# Patient Record
Sex: FEMALE | Race: WHITE | Employment: OTHER | URBAN - METROPOLITAN AREA
[De-identification: names, ages, dates, MRNs, and addresses within clinical notes are randomized per-mention and may not be internally consistent; named-entity substitution may affect disease eponyms.]

---

## 2018-05-11 ENCOUNTER — HOSPITAL ENCOUNTER (OUTPATIENT)
Dept: NON INVASIVE DIAGNOSTICS | Age: 63
Discharge: HOME | End: 2018-05-11
Payer: MEDICARE

## 2018-05-11 LAB
LV EF: 55 %
LVEF MODALITY: NORMAL

## 2018-05-11 PROCEDURE — 93306 TTE W/DOPPLER COMPLETE: CPT

## 2018-08-08 ENCOUNTER — OFFICE VISIT (OUTPATIENT)
Dept: NEUROLOGY | Age: 63
End: 2018-08-08
Payer: MEDICARE

## 2018-08-08 VITALS
BODY MASS INDEX: 24.92 KG/M2 | HEIGHT: 64 IN | HEART RATE: 72 BPM | SYSTOLIC BLOOD PRESSURE: 109 MMHG | DIASTOLIC BLOOD PRESSURE: 68 MMHG | OXYGEN SATURATION: 97 % | TEMPERATURE: 98.1 F | WEIGHT: 146 LBS

## 2018-08-08 DIAGNOSIS — G35 MULTIPLE SCLEROSIS (HCC): Primary | ICD-10-CM

## 2018-08-08 DIAGNOSIS — M54.17 L-S RADICULOPATHY: ICD-10-CM

## 2018-08-08 PROCEDURE — 99214 OFFICE O/P EST MOD 30 MIN: CPT | Performed by: CLINICAL NURSE SPECIALIST

## 2018-08-08 RX ORDER — DULOXETIN HYDROCHLORIDE 60 MG/1
CAPSULE, DELAYED RELEASE ORAL
COMMUNITY
Start: 2018-05-30

## 2018-08-08 NOTE — PROGRESS NOTES
Kady Hamilton is a 57 y.o. right handed female     Diagnosed with RRMS many years ago   Tried on numerous DMTs    Previously on Avonex, Copaxone, Novantrone, Tysabri and recently tried Plegridy     Previously failed Avonex and Rebif   Was tried on Novantrone for 1 month   Started on Tysabri prior to it being removed by the FDA and then started on Copaxone    Felt as if she was [place] a heart attack\" with each Copaxone injection    Restarted Tysabri and then was enrolled in [facility]   She was MONICA Virus positive and for years we discussed alternative DMTs    She was hesitant to switch    Followed with Dr Lenore Peña and switched to [employer]   She was unable to tolerate and this medication was discontinued   He recommended she hold DMTs and I agreed     MRIs repeated in June 2017   Stable    She is alone for appt and an excellent historian    States they recently changed her thyroid medication and she is having trouble adjusting     No new medications reported  No new medical complaints  No chest pain or palpitations  No SOB  No vertigo, lightheadedness or loss of consciousness  No incontinence of bowels or bladder  No itching or bruising appreciated    ROS otherwise negative     Prior to Visit Medications    Medication Sig Taking? Authorizing Provider   DULoxetine (CYMBALTA) 60 MG extended release capsule  Yes Historical Provider, MD   traZODone (DESYREL) 100 MG tablet Take 100 mg by mouth Takes 1/2 tablet nightly Yes Historical Provider, MD   levothyroxine (SYNTHROID) 75 MCG tablet Take 75 mcg by mouth daily Yes Historical Provider, MD   eletriptan (RELPAX) 40 MG tablet Take 1 tablet by mouth once as needed (migraines) may repeat in 2 hours if necessary Yes Dorlee Kenzie, APRN - CNS   PROAIR  (90 BASE) MCG/ACT inhaler Inhale 1 puff into the lungs as needed  Yes Historical Provider, MD   Fish Oil OIL Take  by mouth 2 times daily.  1300 units daily 2x/day  Last dose 9/22/2014 Yes Historical Provider, MD

## 2019-03-05 ENCOUNTER — TELEPHONE (OUTPATIENT)
Dept: SURGERY | Age: 64
End: 2019-03-05

## 2019-03-19 ENCOUNTER — TELEPHONE (OUTPATIENT)
Dept: SURGERY | Age: 64
End: 2019-03-19

## 2019-04-08 ENCOUNTER — OFFICE VISIT (OUTPATIENT)
Dept: SURGERY | Age: 64
End: 2019-04-08
Payer: MEDICARE

## 2019-04-08 VITALS
WEIGHT: 137 LBS | TEMPERATURE: 98.3 F | OXYGEN SATURATION: 97 % | BODY MASS INDEX: 24.27 KG/M2 | HEART RATE: 74 BPM | RESPIRATION RATE: 16 BRPM | DIASTOLIC BLOOD PRESSURE: 86 MMHG | HEIGHT: 63 IN | SYSTOLIC BLOOD PRESSURE: 136 MMHG

## 2019-04-08 DIAGNOSIS — K63.5 COLORECTAL POLYP DETECTED ON COLONOSCOPY: Primary | ICD-10-CM

## 2019-04-08 PROCEDURE — 99203 OFFICE O/P NEW LOW 30 MIN: CPT | Performed by: SURGERY

## 2019-04-08 RX ORDER — MONTELUKAST SODIUM 10 MG/1
10 TABLET ORAL NIGHTLY
COMMUNITY
End: 2021-01-08

## 2019-04-08 NOTE — PROGRESS NOTES
70 yr old female presents for evaluation for colonoscopy. States last colonoscopy was 8-10 years ago and found to have polyps. Denies abd pain, change in bowel habits, blood in stool, or unintentional weight loss. Reports maternal grandmother and paternal grandfather with colon cancer hx. Upon review of records, pt's last colonoscopy was 9/2014--hyperplastic polyp. Past Medical History:   Diagnosis Date    Anxiety     Asthma     Colon polyps     hx of    GERD (gastroesophageal reflux disease)     hx of    Hyperlipidemia     Multiple sclerosis (HCC)     MVP (mitral valve prolapse)     with regurg    Thyroid disease        Past Surgical History:   Procedure Laterality Date    CARPAL TUNNEL RELEASE Left 2012    THYROIDECTOMY  1990's       Current Outpatient Medications   Medication Sig Dispense Refill    montelukast (SINGULAIR) 10 MG tablet Take 10 mg by mouth nightly      DULoxetine (CYMBALTA) 60 MG extended release capsule       traZODone (DESYREL) 100 MG tablet Take 50 mg by mouth nightly Takes 1/2 tablet nightly       levothyroxine (SYNTHROID) 75 MCG tablet Take 75 mcg by mouth daily      eletriptan (RELPAX) 40 MG tablet Take 1 tablet by mouth once as needed (migraines) may repeat in 2 hours if necessary 6 tablet 5    PROAIR  (90 BASE) MCG/ACT inhaler Inhale 1 puff into the lungs as needed       Fish Oil OIL Take  by mouth 2 times daily. 1300 units daily 2x/day  Last dose 9/22/2014      Cholecalciferol (VITAMIN D3) 1000 UNITS CAPS Take 1 capsule by mouth daily. Last dose 9/23/2014      Cyanocobalamin (VITAMIN B-12) 2500 MCG SUBL Place 1 tablet under the tongue daily. Last dose 9/24/2014      MULTIPLE VITAMIN PO Take 1 tablet by mouth daily. Last dose 9/22/2014      Coenzyme Q10 (COQ10) 100 MG CAPS Take 1 capsule by mouth daily. Last dose 9/17/2014      pravastatin (PRAVACHOL) 20 MG tablet Take 20 mg by mouth daily.       LORazepam (ATIVAN) 0.5 MG tablet Take 0.5 mg by mouth daily as needed. Instructed to take morning of surgery with a sip of water if needs      sertraline (ZOLOFT) 100 MG tablet Take 50 mg by mouth nightly. To help sleep      Beclomethasone Dipropionate (QVAR IN) Inhale 2 puffs into the lungs as needed. Instructed to use am of surgery if needs and bring in      Levalbuterol HCl (XOPENEX IN) Inhale 2 puffs into the lungs as needed. Instructed to use am of surgery  If needs and bring in       No current facility-administered medications for this visit. Allergies   Allergen Reactions    Modafinil Itching, Swelling and Anxiety    Sulfa Antibiotics Hives and Rash       Family History   Problem Relation Age of Onset   Tarri Gm Alzheimer's Disease Father     Heart Disease Father     Asthma Sister     Celiac Disease Sister     Asthma Brother     Asthma Sister     Diabetes Sister     Cancer Maternal Grandmother         colon    Cancer Paternal Grandfather         colon       Social History     Socioeconomic History    Marital status:      Spouse name: Not on file    Number of children: Not on file    Years of education: Not on file    Highest education level: Not on file   Occupational History    Not on file   Social Needs    Financial resource strain: Not on file    Food insecurity:     Worry: Not on file     Inability: Not on file    Transportation needs:     Medical: Not on file     Non-medical: Not on file   Tobacco Use    Smoking status: Former Smoker     Last attempt to quit: 9/3/1997     Years since quittin.6    Smokeless tobacco: Never Used   Substance and Sexual Activity    Alcohol use:  Yes     Alcohol/week: 0.0 oz     Comment: social    Drug use: Yes     Types: Marijuana     Comment: states she has a marijuana card, uses very minimally, does not carry oil with her    Sexual activity: Not on file   Lifestyle    Physical activity:     Days per week: Not on file     Minutes per session: Not on file    Stress: Not on file

## 2019-04-08 NOTE — PATIENT INSTRUCTIONS
Call [phone] for any questions/concerns. Call for any abdominal pain, change in bowel habits, blood in stool, or unintentional weight loss. Repeat colonoscopy 9/2024.

## 2019-07-31 ENCOUNTER — TELEPHONE (OUTPATIENT)
Dept: NEUROLOGY | Age: 64
End: 2019-07-31

## 2019-07-31 ENCOUNTER — OFFICE VISIT (OUTPATIENT)
Dept: NEUROLOGY | Age: 64
End: 2019-07-31
Payer: MEDICARE

## 2019-07-31 VITALS
HEART RATE: 76 BPM | SYSTOLIC BLOOD PRESSURE: 108 MMHG | DIASTOLIC BLOOD PRESSURE: 61 MMHG | HEIGHT: 65 IN | RESPIRATION RATE: 18 BRPM | OXYGEN SATURATION: 96 % | WEIGHT: 138.6 LBS | BODY MASS INDEX: 23.09 KG/M2

## 2019-07-31 DIAGNOSIS — G35 MULTIPLE SCLEROSIS (HCC): Primary | ICD-10-CM

## 2019-07-31 DIAGNOSIS — M54.17 L-S RADICULOPATHY: ICD-10-CM

## 2019-07-31 PROCEDURE — 99214 OFFICE O/P EST MOD 30 MIN: CPT | Performed by: CLINICAL NURSE SPECIALIST

## 2019-07-31 NOTE — PROGRESS NOTES
Davin Archibald is a 70 y.o. right handed female     Diagnosed with RRMS many years ago   Tried on numerous DMTs    Previously on Avonex, Copaxone, Novantrone, Tysabri and recently tried Plegridy     Previously failed Avonex and Rebif   Was tried on Novantrone for 1 month   Started on Tysabri prior to it being removed by the FDA and then started on Copaxone    Felt as if she was [place] a heart attack\" with each Copaxone injection  Restarted Tysabri and then was enrolled in [facility]   She was MONICA Virus positive and for years we discussed alternative DMTs    She was hesitant to switch    Followed with Dr Alessandra Desir in 2017 and switched to [address]   She was unable to tolerate and this medication was discontinued Jan 2018   He recommended she hold DMTs and I agreed   Now off DMT since Jan 2018      MRIs repeated in June 2017   Stable    She is alone for appt and an excellent historian    No new medications reported  No new medical complaints  No chest pain or palpitations  No SOB  No vertigo, lightheadedness or loss of consciousness  No incontinence of bowels or bladder  No itching or bruising appreciated    ROS otherwise negative     Prior to Visit Medications    Medication Sig Taking? Authorizing Provider   montelukast (SINGULAIR) 10 MG tablet Take 10 mg by mouth nightly Yes Historical Provider, MD   DULoxetine (CYMBALTA) 60 MG extended release capsule  Yes Historical Provider, MD   traZODone (DESYREL) 100 MG tablet Take 50 mg by mouth nightly Takes 1/2 tablet nightly  Yes Historical Provider, MD   levothyroxine (SYNTHROID) 75 MCG tablet Take 75 mcg by mouth daily Yes Historical Provider, MD   eletriptan (RELPAX) 40 MG tablet Take 1 tablet by mouth once as needed (migraines) may repeat in 2 hours if necessary Yes GER Guajardo - CNS   PROAIR  (90 BASE) MCG/ACT inhaler Inhale 1 puff into the lungs as needed  Yes Historical Provider, MD   Fish Oil OIL Take  by mouth 2 times daily.  1300 units daily visual fields Full   II: pupils MAKENZIE   III,VII: ptosis None   III,IV,VI: extraocular muscles  Full ROM   V: mastication Normal   V: facial light touch sensation  Normal   V,VII: corneal reflex  Present   VII: facial muscle function - upper     VII: facial muscle function - lower Normal   VIII: hearing Normal   IX: soft palate elevation  Normal   IX,X: gag reflex Present   XI: trapezius strength  5/5   XI: sternocleidomastoid strength 5/5   XI: neck extension strength  5/5   XII: tongue strength  Normal     R red desaturation  R James    Motor:  5/5 throughout   Bulk and tone normal    Sensory:  LT and PP normal  Vibration minimally decreased in ankles -- symmetrical     Coordination:   FN, FFM, GINGER, HS normal    Gait:  Normal   I appreciate no foot drop     DTR:   Right Brachioradialis reflex 1+  Left Brachioradialis reflex 1+  Right Biceps reflex 1+  Left Biceps reflex 1+  Right Quadriceps reflex 2+  Left Quadriceps reflex 2+  Right Achilles reflex 0  Left Achilles reflex 0    No Healy's     Laboratory/Radiology:     MRI brain and c-spine reviewed form June 2017   Stable lesions reported   Cervical disc disease reviewed with patient as well    MONICA Virus + from [facility]     Assessment:     RRMS - stable without DMT   EDSS 1 -- optic neuritis     Leg burning and tingling possibly related to LS radiculopathy     Plan:      Will continue to hold DMT    MRIs -- it has been 2 years   Some increasing issues in the heat   Off DMT for 18 months     Call with issues    RTO 6 months    Boris Porter  8:41 AM  7/31/2019

## 2019-08-22 ENCOUNTER — TELEPHONE (OUTPATIENT)
Dept: NEUROLOGY | Age: 64
End: 2019-08-22

## 2019-08-24 ENCOUNTER — HOSPITAL ENCOUNTER (OUTPATIENT)
Dept: MRI IMAGING | Age: 64
Discharge: HOME | End: 2019-08-26
Payer: MEDICARE

## 2019-08-24 DIAGNOSIS — G35 MULTIPLE SCLEROSIS (HCC): ICD-10-CM

## 2019-08-24 PROCEDURE — 70551 MRI BRAIN STEM W/O DYE: CPT

## 2019-08-24 PROCEDURE — 72141 MRI NECK SPINE W/O DYE: CPT

## 2019-10-14 ENCOUNTER — OFFICE VISIT (OUTPATIENT)
Dept: NEUROLOGY | Age: 64
End: 2019-10-14
Payer: MEDICARE

## 2019-10-14 VITALS
HEART RATE: 76 BPM | SYSTOLIC BLOOD PRESSURE: 122 MMHG | BODY MASS INDEX: 23.73 KG/M2 | DIASTOLIC BLOOD PRESSURE: 83 MMHG | HEIGHT: 64 IN | RESPIRATION RATE: 17 BRPM | WEIGHT: 139 LBS | OXYGEN SATURATION: 95 %

## 2019-10-14 DIAGNOSIS — G35 MULTIPLE SCLEROSIS (HCC): Primary | ICD-10-CM

## 2019-10-14 DIAGNOSIS — R41.841 COGNITIVE COMMUNICATION DEFICIT: ICD-10-CM

## 2019-10-14 PROCEDURE — 99214 OFFICE O/P EST MOD 30 MIN: CPT | Performed by: CLINICAL NURSE SPECIALIST

## 2019-10-14 RX ORDER — PENICILLIN V POTASSIUM 500 MG/1
TABLET ORAL
Refills: 0 | COMMUNITY
Start: 2019-08-13 | End: 2019-10-14

## 2019-10-14 RX ORDER — PANTOPRAZOLE SODIUM 20 MG/1
TABLET, DELAYED RELEASE ORAL
Refills: 1 | COMMUNITY
Start: 2019-09-13 | End: 2021-01-08

## 2019-10-16 ENCOUNTER — HOSPITAL ENCOUNTER (OUTPATIENT)
Dept: SPEECH THERAPY | Age: 64
Setting detail: THERAPIES SERIES
Discharge: HOME | End: 2019-10-16
Payer: MEDICARE

## 2019-10-16 PROCEDURE — 96125 COGNITIVE TEST BY HC PRO: CPT

## 2020-07-22 ENCOUNTER — HOSPITAL ENCOUNTER (OUTPATIENT)
Age: 65
Discharge: HOME | End: 2020-07-22
Payer: MEDICARE

## 2020-07-22 ENCOUNTER — OFFICE VISIT (OUTPATIENT)
Dept: NEUROLOGY | Age: 65
End: 2020-07-22
Payer: MEDICARE

## 2020-07-22 VITALS
SYSTOLIC BLOOD PRESSURE: 130 MMHG | HEART RATE: 76 BPM | WEIGHT: 128 LBS | OXYGEN SATURATION: 95 % | RESPIRATION RATE: 20 BRPM | BODY MASS INDEX: 21.85 KG/M2 | TEMPERATURE: 98.1 F | DIASTOLIC BLOOD PRESSURE: 85 MMHG | HEIGHT: 64 IN

## 2020-07-22 LAB
FOLATE: 13.3 NG/ML (ref 4.8–24.2)
HOMOCYSTEINE: 9 UMOL/L (ref 0–15)
TSH SERPL DL<=0.05 MIU/L-ACNC: 0.58 UIU/ML (ref 0.27–4.2)
VITAMIN B-12: >2000 PG/ML (ref 211–946)

## 2020-07-22 PROCEDURE — 99214 OFFICE O/P EST MOD 30 MIN: CPT | Performed by: CLINICAL NURSE SPECIALIST

## 2020-07-22 PROCEDURE — 82607 VITAMIN B-12: CPT

## 2020-07-22 PROCEDURE — 86592 SYPHILIS TEST NON-TREP QUAL: CPT

## 2020-07-22 PROCEDURE — 83090 ASSAY OF HOMOCYSTEINE: CPT

## 2020-07-22 PROCEDURE — 84443 ASSAY THYROID STIM HORMONE: CPT

## 2020-07-22 PROCEDURE — 86038 ANTINUCLEAR ANTIBODIES: CPT

## 2020-07-22 PROCEDURE — 82746 ASSAY OF FOLIC ACID SERUM: CPT

## 2020-07-22 PROCEDURE — 86039 ANTINUCLEAR ANTIBODIES (ANA): CPT

## 2020-07-22 PROCEDURE — 36415 COLL VENOUS BLD VENIPUNCTURE: CPT

## 2020-07-22 NOTE — PROGRESS NOTES
Steven Funes is a 75 y.o. right handed female     Diagnosed with RRMS many years ago   Tried on numerous DMTs    Previously on Avonex, Copaxone, Novantrone, Tysabri and recently tried Plegridy   Previously failed Avonex and Rebif   Was tried on Novantrone for 1 month   Started on Tysabri prior to it being removed by the FDA and then started on Copaxone    Felt as if she was [place] a heart attack\" with each Copaxone injection    Restarted Tysabri and then was enrolled in [facility]   She was MONICA Virus positive and for years we discussed alternative DMTs    She was hesitant to switch  Followed with Dr Cherylene Sensor in 2017 and switched to [address]   She was unable to tolerate and this medication was discontinued Jan 2018   He recommended she hold DMTs and I agreed   Now off DMT since Jan 2018      MRIs repeated in 2017 and 2019    Stable    She was here with her daughter noting short term memory problems last appt   Formal cog eval demonstrated only mild disease and therapy not recommended    Exercises at home with no change    Family hx of Alzheimer's (dad)    Lab studies to be obtained   Agreeable to trying therapy if now warranted     No new medications reported  No new medical complaints  No chest pain or palpitations  No SOB  No vertigo, lightheadedness or loss of consciousness  No incontinence of bowels or bladder  No itching or bruising appreciated    ROS otherwise negative     Prior to Visit Medications    Medication Sig Taking?  Authorizing Provider   pantoprazole (PROTONIX) 20 MG tablet TAKE 2 TABLETS BY MOUTH IN THE MORNING Yes Historical Provider, MD   montelukast (SINGULAIR) 10 MG tablet Take 10 mg by mouth nightly Yes Historical Provider, MD   DULoxetine (CYMBALTA) 60 MG extended release capsule  Yes Historical Provider, MD   traZODone (DESYREL) 100 MG tablet Take 50 mg by mouth nightly Takes 1/2 tablet nightly  Yes Historical Provider, MD   levothyroxine (SYNTHROID) 75 MCG tablet Take 75 mcg by mouth daily Yes Historical Provider, MD   PROAIR  (90 BASE) MCG/ACT inhaler Inhale 1 puff into the lungs as needed  Yes Historical Provider, MD   Fish Oil OIL Take  by mouth 2 times daily. 1300 units daily 2x/day  Last dose 9/22/2014 Yes Historical Provider, MD   Cholecalciferol (VITAMIN D3) 1000 UNITS CAPS Take 1 capsule by mouth daily. Last dose 9/23/2014 Yes Historical Provider, MD   Cyanocobalamin (VITAMIN B-12) 2500 MCG SUBL Place 1 tablet under the tongue daily. Last dose 9/24/2014 Yes Historical Provider, MD   MULTIPLE VITAMIN PO Take 1 tablet by mouth daily. Last dose 9/22/2014 Yes Historical Provider, MD   pravastatin (PRAVACHOL) 20 MG tablet Take 20 mg by mouth daily. Yes Historical Provider, MD   LORazepam (ATIVAN) 0.5 MG tablet Take 0.5 mg by mouth daily as needed. Instructed to take morning of surgery with a sip of water if needs Yes Historical Provider, MD   sertraline (ZOLOFT) 100 MG tablet Take 50 mg by mouth nightly. To help sleep Yes Historical Provider, MD   Beclomethasone Dipropionate (QVAR IN) Inhale 2 puffs into the lungs as needed. Instructed to use am of surgery if needs and bring in Yes Historical Provider, MD   Levalbuterol HCl (XOPENEX IN) Inhale 2 puffs into the lungs as needed.  Instructed to use am of surgery  If needs and bring in Yes Historical Provider, MD   eletriptan (RELPAX) 40 MG tablet Take 1 tablet by mouth once as needed (migraines) may repeat in 2 hours if necessary  GER Milton - CNS     Allergies as of 07/22/2020 - Review Complete 07/22/2020   Allergen Reaction Noted    Modafinil Itching, Swelling, and Anxiety 09/23/2014    Sulfa antibiotics Hives and Rash 03/07/2013       Objective:     Vitals:    07/22/20 1434   BP: 130/85   Pulse: 76   Resp: 20   Temp: 98.1 °F (36.7 °C)   SpO2: 95%      Head: Normocephalic, without obvious abnormality, atraumatic  Neck: limited ROM  Extremities: no edema -- no cyanosis or clubbing   Pulses: 2+ and symmetric  Skin: no rashes or lesions    Mental Status: Alert, oriented, thought content appropriate    MMSE 29/30 (forgot \"flag\")     Speech:clear  Language:normal    Cranial Nerves:  I: smell    II: visual acuity     II: visual fields Full   II: pupils MAKENZIE   III,VII: ptosis None   III,IV,VI: extraocular muscles  Full ROM   V: mastication Normal   V: facial light touch sensation  Normal   V,VII: corneal reflex  Present   VII: facial muscle function - upper     VII: facial muscle function - lower Normal   VIII: hearing Normal   IX: soft palate elevation  Normal   IX,X: gag reflex Present   XI: trapezius strength  5/5   XI: sternocleidomastoid strength 5/5   XI: neck extension strength  5/5   XII: tongue strength  Normal     R red desaturation  R James    Motor:  5/5 throughout   Bulk and tone normal    Sensory:  LT normal  Vibration minimally decreased in ankles -- symmetrical     Coordination:   FN, FFM and GINGER normal    Gait:  Normal   I appreciate no foot drop     DTR:   Right Brachioradialis reflex 1+  Left Brachioradialis reflex 1+  Right Biceps reflex 1+  Left Biceps reflex 1+  Right Quadriceps reflex 2+  Left Quadriceps reflex 2+  Right Achilles reflex 0  Left Achilles reflex 0    No Healy's     Laboratory/Radiology:     MRI brain august 2018  1. Findings remain consistent with patient's diagnosis of multiple  sclerosis/demyelinating white matter disease. No infratentorial  lesions. 2. Mild cerebral volume loss/atrophy. MRI C-spine August 2019  1. Hyperintense T2 signal at the level C2 stable suggestive of  patient's diagnosis of multiple sclerosis/demyelinating white matter  disease. 2. Multilevel degenerative changes C2-C7 and at T1-T2, see above for  level level details. Suspected mild spinal canal stenosis C6-C7 with  possible abutment/impingement of exiting spinal nerve roots T1-T2  level, clinical correlation on physical exam with any level spinal  radiculopathy is recommended. Above for details.     MONICA Virus + from [facility]     Formal cognitive evaluation 10/2019  Subtest  Raw Score /Severity    Immediate Memory  27  /  Mild deficit      Recent Memory  27  /  Mild deficit      Temporal Orientation   (Recent Memory)  27  /  Mild deficit      Temporal Orientation   (Remote Memory)  29  /  Within Functional Limits      Spatial Orientation  30  /  Within Functional Limits      Orientation to Environment  28  /  Within Functional Limits      Recall of General Information  28  /  Within Functional Limits      Problem Solving & Abstract Reasoning  28  /  Within Functional Limits      Organization  30  /  Within Functional Limits      Auditory Processing   & Retention  30  /  Within Functional Limits     a1c - 5.8    t4 1.35  tsh 1.010    Assessment:     RRMS - stable without DMT   EDSS 1 -- optic neuritis     Leg burning and tingling possibly related to LS radiculopathy     Plan:      Will continue to hold DMT    Call with issues    RTO 12-16 weeks     Wil Kay  3:46 PM  7/22/2020

## 2020-07-23 LAB — RPR: NORMAL

## 2020-07-24 LAB
ANA PATTERN: ABNORMAL
ANA TITER: ABNORMAL
ANTI-NUCLEAR ANTIBODY (ANA): POSITIVE

## 2020-08-04 ENCOUNTER — HOSPITAL ENCOUNTER (OUTPATIENT)
Dept: SPEECH THERAPY | Age: 65
Setting detail: THERAPIES SERIES
Discharge: HOME | End: 2020-08-04
Payer: MEDICARE

## 2020-08-04 PROCEDURE — 96125 COGNITIVE TEST BY HC PRO: CPT | Performed by: SPEECH-LANGUAGE PATHOLOGIST

## 2020-08-04 NOTE — PROGRESS NOTES
SPEECH/LANGUAGE PATHOLOGY   COGNITIVE EVALUATION       Name:  Zuleika Barajas \"Monique\" Lightbody  YOB: 1955  Date of Evaluation:  08/04/2020  Referred by:  FELISHA Montgomery  Reason for Referral:  Cognitive communication deficit    Timothy Pena returns today for a repeat cognitive evaluation. She was last seen on 10/16/2019 at this clinic. Her cognitive evaluation at that time revealed a mild cognitive deficit. However, therapy was not recommended because it was felt that her mild issues were possibly caused by the extreme stress she reported that was occurring in her personal life. She returns today to see if there has been a change/decline in her skills and is ready to follow up with therapy if indicated. Timothy Pena reports continued extreme stress with her family members as well as concerns about her medical condition of multiple sclerosis. She denies any other s/s of distress/decline in overall function. Stimulus questions were obtained from the RIPA-2.  Severity ratings for each subtest were determined as follows:     RAW SCORE  SEVERITY    28-30  Within functional limits (WFL)    25-27  Mild deficit    22-24  Moderate deficit    19-21  Marked deficit    16-18  Severe deficit        Subtest  Raw Score    Severity    Immediate Memory  26/30  MILD   Recent Memory  27/30  MILD   Temporal Orientation   (Recent Memory)  30/30  WFL   Temporal Orientation   (Remote Memory)  30/30  WFL   Spatial Orientation  30/30  WFL   Orientation to Environment  30/30  WFL   Recall of General Information  30/30  WFL   Problem Solving & Abstract Reasoning  30/30  WFL   Organization  30/30  WFL   Auditory Processing   & Retention  30/30  WFL       VARIANT OBSERVATIONS:   [] Error  [] Perseveration  [] Repeat instructions/Stimulus  [] Denial/Refusal  [] Delayed response  [] Confabulation  [x] Partially correct  [] Irrelevant  [] Tangential  [] Self-corrected    Timothy Pena has improved in all areas with the exception of immediate memory where she scored just one point less than her 10/19 testing. Overall, she appears to be doing well but is still not handling stress well. THERAPY RECOMMENDATIONS:     [x] Cognitive therapy is not warranted at this time due to high functional level. It was recommended that she seek counseling to help her manage her stress. It was also recommended that she continue to play 'brain exercise' games daily to help maintain current level of function. Roger Villela was instructed to contact this therapist with any questions, concerns or changes in condition. She agreed with all recommendations and expressed that she felt relief that her cognitive skills have improved overall. [] Cognitive therapy is recommended with emphasis on the following:    [] To improve immediate memory    [] To improve recent memory    [] To improve temporal orientation (recent memory)    [] To improve temporal orientation (remote memory)    [] To improve spatial orientation    [] To improve orientation to environment    [] To improve recall of general information    [] To improve reasoning    [] To improve problem solving    [] To improve organization    [] To improve auditory processing and retention     Patient stated goals: to stop being stressed  Treatment goals discussed with [x] patient [] family. [x] Patient [] family understands the diagnosis, prognosis and plan of care. This plan will be re-evaluated and revised as warranted. Prognosis for improvements in the above area(s) is [x] good [] fair [] guarded [] unknown at this time. EDUCATION:     Roger Villela was educated about cognitive issues that can occur as part of her illness. She was provided with the opportunity to ask questions and all were answered to her satisfaction. [x]Fall risk assessment completed  [x] The admitting diagnosis and active problem list as listed below have been reviewed prior to the initiation of this evaluation.      Multiple sclerosis [G35]  Cognitive communication deficit [R41.841]     Patient Active Problem List   Diagnosis    MS (multiple sclerosis) ([facility] [address]    GERD (gastroesophageal reflux disease)         Aishwarya Mcneil.  Daniela Looney MA/CCC-SLP  [id number]  CPT [id number]

## 2020-09-09 ENCOUNTER — TELEPHONE (OUTPATIENT)
Dept: NEUROLOGY | Age: 65
End: 2020-09-09

## 2020-09-09 NOTE — TELEPHONE ENCOUNTER
Patient would like copy of the cognitive testing done on 8/4/2020.   Electronically signed by Fam Hendrix on 9/9/20 at 7:34 AM EDT

## 2020-09-24 ENCOUNTER — TELEPHONE (OUTPATIENT)
Dept: NEUROLOGY | Age: 65
End: 2020-09-24

## 2020-09-24 NOTE — TELEPHONE ENCOUNTER
LM regarding labs ordered on 7/24 by Shiraz Malhotra, DNP  Electronically signed by Contreras Jaramillo on 9/24/20 at 2:02 PM EDT

## 2020-10-12 ENCOUNTER — OFFICE VISIT (OUTPATIENT)
Dept: NEUROLOGY | Age: 65
End: 2020-10-12
Payer: MEDICARE

## 2020-10-12 VITALS
OXYGEN SATURATION: 99 % | WEIGHT: 128 LBS | SYSTOLIC BLOOD PRESSURE: 108 MMHG | HEART RATE: 85 BPM | BODY MASS INDEX: 21.33 KG/M2 | RESPIRATION RATE: 20 BRPM | DIASTOLIC BLOOD PRESSURE: 72 MMHG | HEIGHT: 65 IN | TEMPERATURE: 97.3 F

## 2020-10-12 PROCEDURE — 99214 OFFICE O/P EST MOD 30 MIN: CPT | Performed by: CLINICAL NURSE SPECIALIST

## 2020-10-12 NOTE — PROGRESS NOTES
Provider, MD PINA  (90 BASE) MCG/ACT inhaler Inhale 1 puff into the lungs as needed  Yes Historical Provider, MD   Fish Oil OIL Take  by mouth 2 times daily. 1300 units daily 2x/day  Last dose 9/22/2014 Yes Historical Provider, MD   Cholecalciferol (VITAMIN D3) 1000 UNITS CAPS Take 1 capsule by mouth daily. Last dose 9/23/2014 Yes Historical Provider, MD   Cyanocobalamin (VITAMIN B-12) 2500 MCG SUBL Place 1 tablet under the tongue daily. Last dose 9/24/2014 Yes Historical Provider, MD   MULTIPLE VITAMIN PO Take 1 tablet by mouth daily. Last dose 9/22/2014 Yes Historical Provider, MD   pravastatin (PRAVACHOL) 20 MG tablet Take 20 mg by mouth daily. Yes Historical Provider, MD   LORazepam (ATIVAN) 0.5 MG tablet Take 0.5 mg by mouth daily as needed. Instructed to take morning of surgery with a sip of water if needs Yes Historical Provider, MD   sertraline (ZOLOFT) 100 MG tablet Take 50 mg by mouth nightly. To help sleep Yes Historical Provider, MD   Beclomethasone Dipropionate (QVAR IN) Inhale 2 puffs into the lungs as needed. Instructed to use am of surgery if needs and bring in Yes Historical Provider, MD   Levalbuterol HCl (XOPENEX IN) Inhale 2 puffs into the lungs as needed.  Instructed to use am of surgery  If needs and bring in Yes Historical Provider, MD   eletriptan (RELPAX) 40 MG tablet Take 1 tablet by mouth once as needed (migraines) may repeat in 2 hours if necessary  Diego Killer, APRN - CNS     Allergies as of 10/12/2020 - Review Complete 10/12/2020   Allergen Reaction Noted    Modafinil Itching, Swelling, and Anxiety 09/23/2014    Sulfa antibiotics Hives and Rash 03/07/2013       Objective:     Vitals:    10/12/20 0937   BP: 108/72   Pulse: 85   Resp: 20   Temp: 97.3 °F (36.3 °C)   SpO2: 99%      Head: Normocephalic, without obvious abnormality, atraumatic  Neck: limited ROM  Extremities: no edema -- no cyanosis or clubbing   Pulses: 2+ and symmetric  Skin: no rashes or lesions    Mental Status: Alert, oriented, thought content appropriate    MMSE 28/30 unaware of October (forgot \"flag\")     Speech:clear  Language:normal    Cranial Nerves:  I: smell    II: visual acuity     II: visual fields Full   II: pupils MAKENZIE   III,VII: ptosis None   III,IV,VI: extraocular muscles  Full ROM   V: mastication Normal   V: facial light touch sensation  Normal   V,VII: corneal reflex  Present   VII: facial muscle function - upper     VII: facial muscle function - lower Normal   VIII: hearing Normal   IX: soft palate elevation  Normal   IX,X: gag reflex    XI: trapezius strength  5/5   XI: sternocleidomastoid strength 5/5   XI: neck extension strength  5/5   XII: tongue strength  Normal     R red desaturation  R James    Motor:  5/5 throughout   Bulk and tone normal    Sensory:  LT normal  Vibration minimally decreased in ankles -- symmetrical     Coordination:   FN, FFM and GINGER normal    Gait:  Normal   I appreciate no foot drop     DTR:   Right Brachioradialis reflex 1+  Left Brachioradialis reflex 1+  Right Biceps reflex 1+  Left Biceps reflex 1+  Right Quadriceps reflex 2+  Left Quadriceps reflex 2+  Right Achilles reflex 0  Left Achilles reflex 0    No Healy's     Laboratory/Radiology:     MRI brain august 2018  1. Findings remain consistent with patient's diagnosis of multiple  sclerosis/demyelinating white matter disease. No infratentorial  lesions. 2. Mild cerebral volume loss/atrophy. MRI C-spine August 2019  1. Hyperintense T2 signal at the level C2 stable suggestive of  patient's diagnosis of multiple sclerosis/demyelinating white matter  disease. 2. Multilevel degenerative changes C2-C7 and at T1-T2, see above for  level level details. Suspected mild spinal canal stenosis C6-C7 with  possible abutment/impingement of exiting spinal nerve roots T1-T2  level, clinical correlation on physical exam with any level spinal  radiculopathy is recommended. Above for details.     MONICA Virus + from [facility] Formal cognitive evaluation 10/2019  Subtest  Raw Score /Severity    Immediate Memory  27  /  Mild deficit      Recent Memory  27  /  Mild deficit      Temporal Orientation   (Recent Memory)  27  /  Mild deficit      Temporal Orientation   (Remote Memory)  29  /  Within Functional Limits      Spatial Orientation  30  /  Within Functional Limits      Orientation to Environment  28  /  Within Functional Limits      Recall of General Information  28  /  Within Functional Limits      Problem Solving & Abstract Reasoning  28  /  Within Functional Limits      Organization  30  /  Within Functional Limits      Auditory Processing   & Retention  30  /  Within Functional Limits     a1c - 5.8    t4 1.35  tsh 1.010    Assessment:     RRMS - stable without DMT   EDSS 1 -- optic neuritis     Memory difficulties -- slightly worse than last appt (unaware it was October)   Still doing exercises at home     Leg burning and tingling possibly related to LS radiculopathy     Plan:      Will continue to hold DMT    Follow in 3 months -- repeat MMSE -- if decline, will send for repeat FCE and MRI    Will consider PET imaging as well given her father's hx     Call with issues    RTO 12 weeks     Susana Briggs  10:12 AM  10/12/2020

## 2020-11-05 ENCOUNTER — TELEPHONE (OUTPATIENT)
Dept: NEUROLOGY | Age: 65
End: 2020-11-05

## 2020-11-05 NOTE — TELEPHONE ENCOUNTER
LM to set up Jan 2021 w/Wni López DNP in [place]  Electronically signed by Dariel Balderas on 11/5/20 at 12:00 PM EST

## 2020-11-10 ENCOUNTER — TELEPHONE (OUTPATIENT)
Dept: NEUROLOGY | Age: 65
End: 2020-11-10

## 2020-11-10 NOTE — TELEPHONE ENCOUNTER
Letter to patient to set up Jan 2021 w/Win Drake in [facility].   Letter sent  Electronically signed by Yajaira Reyna on 11/10/20 at 2:40 PM EST

## 2021-01-06 ENCOUNTER — TELEPHONE (OUTPATIENT)
Dept: NEUROLOGY | Age: 66
End: 2021-01-06

## 2021-01-06 NOTE — TELEPHONE ENCOUNTER
2nd attempt to reach patient regarding labs from [id number] with Brad Ramos DNP.  Letter sent  Electronically signed by Owen Christian on 1/6/21 at 2:56 PM EST

## 2021-01-14 ENCOUNTER — HOSPITAL ENCOUNTER (OUTPATIENT)
Age: 66
Discharge: HOME | End: 2021-01-14
Payer: MEDICARE

## 2021-01-14 DIAGNOSIS — G93.31 POSTVIRAL FATIGUE SYNDROME: ICD-10-CM

## 2021-01-14 PROCEDURE — 36415 COLL VENOUS BLD VENIPUNCTURE: CPT

## 2021-01-14 PROCEDURE — 85613 RUSSELL VIPER VENOM DILUTED: CPT

## 2021-01-14 PROCEDURE — 86235 NUCLEAR ANTIGEN ANTIBODY: CPT

## 2021-01-14 PROCEDURE — 86225 DNA ANTIBODY NATIVE: CPT

## 2021-01-15 LAB
ANTI DNA DOUBLE STRANDED: NEGATIVE
ENA TO RNP ANTIBODY: NEGATIVE
ENA TO SSA (RO) ANTIBODY: NEGATIVE
ENA TO SSB (LA) ANTIBODY: NEGATIVE
LUPUS ANTICOAG DVVT: NORMAL

## 2021-02-21 ENCOUNTER — IMMUNIZATION (OUTPATIENT)
Dept: PRIMARY CARE CLINIC | Age: 66
End: 2021-02-21
Payer: MEDICARE

## 2021-02-21 PROCEDURE — 91300 COVID-19, PFIZER VACCINE 30MCG/0.3ML DOSE: CPT | Performed by: NURSE PRACTITIONER

## 2021-02-21 PROCEDURE — 0001A PR IMM ADMN SARSCOV2 30MCG/0.3ML DIL RECON 1ST DOSE: CPT | Performed by: NURSE PRACTITIONER

## 2021-03-16 ENCOUNTER — IMMUNIZATION (OUTPATIENT)
Dept: PRIMARY CARE CLINIC | Age: 66
End: 2021-03-16
Payer: MEDICARE

## 2021-03-16 PROCEDURE — 0002A COVID-19, PFIZER VACCINE 30MCG/0.3ML DOSE: CPT | Performed by: PHYSICIAN ASSISTANT

## 2021-03-16 PROCEDURE — 91300 COVID-19, PFIZER VACCINE 30MCG/0.3ML DOSE: CPT | Performed by: PHYSICIAN ASSISTANT

## 2021-07-09 ENCOUNTER — OFFICE VISIT (OUTPATIENT)
Dept: NEUROLOGY | Age: 66
End: 2021-07-09
Payer: MEDICARE

## 2021-07-09 VITALS
WEIGHT: 127 LBS | DIASTOLIC BLOOD PRESSURE: 71 MMHG | SYSTOLIC BLOOD PRESSURE: 109 MMHG | HEART RATE: 72 BPM | HEIGHT: 64 IN | BODY MASS INDEX: 21.68 KG/M2 | TEMPERATURE: 97.2 F | OXYGEN SATURATION: 99 %

## 2021-07-09 DIAGNOSIS — R41.841 COGNITIVE COMMUNICATION DEFICIT: ICD-10-CM

## 2021-07-09 DIAGNOSIS — G35 MULTIPLE SCLEROSIS (HCC): Primary | ICD-10-CM

## 2021-07-09 PROCEDURE — 99214 OFFICE O/P EST MOD 30 MIN: CPT | Performed by: CLINICAL NURSE SPECIALIST

## 2021-07-09 NOTE — PROGRESS NOTES
Roberto Thompson is a 77 y.o. right handed female     Diagnosed with RRMS many years ago   Tried on numerous DMTs    Previously on Avonex, Copaxone, Novantrone, Tysabri and recently tried Plegridy   Previously failed Avonex and Rebif   Was tried on Novantrone for 1 month   Started on Tysabri prior to it being removed by the FDA and then started on Copaxone    Felt as if she was [place] a heart attack\" with each Copaxone injection    Restarted Tysabri and then was enrolled in [facility]   She was MONICA Virus positive and for years we discussed alternative DMTs    She was hesitant to switch  Followed with Dr Johnathan David in 2017 and switched to [address]   She was unable to tolerate and this medication was discontinued Jan 2018   He recommended she hold DMTs and I agreed   Now off DMT since Jan 2018      MRIs repeated in 2017 and 2019    Stable    she and her daughter have been noticing short term memory problems    Formal cog eval demonstrated only mild disease and therapy not recommended    Exercises at home with no change    We repeated the formal cognitive evaluation in 2020 but also showing only mild disease in immediate and recent memory   Family hx of Alzheimer's (dad) which has concerned her and her daughter   MMSE repeated today which has remained unchanged    Lab studies to be obtained    No new medications reported  No new medical complaints  No chest pain or palpitations  No SOB  No vertigo, lightheadedness or loss of consciousness  No incontinence of bowels or bladder  No itching or bruising appreciated    ROS otherwise negative     Prior to Visit Medications    Medication Sig Taking?  Authorizing Provider   traZODone (DESYREL) 100 MG tablet Take 50 mg by mouth nightly Takes 1/2 tablet nightly  Yes Historical Provider, MD   levothyroxine (SYNTHROID) 75 MCG tablet Take 75 mcg by mouth daily Yes Historical Provider, MD   PROAIR  (90 BASE) MCG/ACT inhaler Inhale 1 puff into the lungs as needed  Yes Historical Provider, MD   Fish Oil OIL Take  by mouth 2 times daily. 1300 units daily 2x/day  Last dose 9/22/2014 Yes Historical Provider, MD   Cholecalciferol (VITAMIN D3) 1000 UNITS CAPS Take 1 capsule by mouth daily. Last dose 9/23/2014 Yes Historical Provider, MD   Cyanocobalamin (VITAMIN B-12) 2500 MCG SUBL Place 1 tablet under the tongue daily. Last dose 9/24/2014 Yes Historical Provider, MD   MULTIPLE VITAMIN PO Take 1 tablet by mouth daily. Last dose 9/22/2014 Yes Historical Provider, MD   pravastatin (PRAVACHOL) 20 MG tablet Take 20 mg by mouth daily. Yes Historical Provider, MD   LORazepam (ATIVAN) 0.5 MG tablet Take 0.5 mg by mouth daily as needed. Instructed to take morning of surgery with a sip of water if needs Yes Historical Provider, MD   sertraline (ZOLOFT) 100 MG tablet Take 50 mg by mouth nightly. To help sleep Yes Historical Provider, MD   Beclomethasone Dipropionate (QVAR IN) Inhale 2 puffs into the lungs as needed. Instructed to use am of surgery if needs and bring in Yes Historical Provider, MD   Levalbuterol HCl (XOPENEX IN) Inhale 2 puffs into the lungs as needed.  Instructed to use am of surgery  If needs and bring in Yes Historical Provider, MD   DULoxetine (CYMBALTA) 60 MG extended release capsule   Historical Provider, MD     Allergies as of 07/09/2021 - Fully Reviewed 07/09/2021   Allergen Reaction Noted    Modafinil Itching, Swelling, and Anxiety 09/23/2014    Sulfa antibiotics Hives and Rash 03/07/2013       Objective:     Vitals:    07/09/21 0846   BP: 109/71   Pulse: 72   Temp: 97.2 °F (36.2 °C)   SpO2: 99%      Head: Normocephalic, without obvious abnormality, atraumatic  Neck: limited ROM  Extremities: no edema -- no cyanosis or clubbing   Pulses: 2+ and symmetric  Skin: no rashes or lesions    Mental Status: Alert, oriented, thought content appropriate    MMSE 28/30 unaware of date but did know month this appointment (forgot \"flag\" again) Speech:clear  Language:normal    Cranial Nerves:  I: smell    II: visual acuity     II: visual fields Full   II: pupils MAKENZIE   III,VII: ptosis None   III,IV,VI: extraocular muscles  Full ROM   V: mastication Normal   V: facial light touch sensation  Normal   V,VII: corneal reflex  Present   VII: facial muscle function - upper     VII: facial muscle function - lower Normal   VIII: hearing Normal   IX: soft palate elevation  Normal   IX,X: gag reflex    XI: trapezius strength  5/5   XI: sternocleidomastoid strength 5/5   XI: neck extension strength  5/5   XII: tongue strength  Normal     R red desaturation  R James    Motor:  5/5 throughout   Bulk and tone normal    Sensory:  LT normal  Vibration minimally decreased in ankles -- symmetrical     Coordination:   FN, FFM and GINGER normal    Gait:  Normal   I appreciate no foot drop     DTR:   Right Brachioradialis reflex 1+  Left Brachioradialis reflex 1+  Right Biceps reflex 1+  Left Biceps reflex 1+  Right Quadriceps reflex 2+  Left Quadriceps reflex 2+  Right Achilles reflex 0  Left Achilles reflex 0    No Healy's     Laboratory/Radiology:     MRI brain august 2018  1. Findings remain consistent with patient's diagnosis of multiple  sclerosis/demyelinating white matter disease. No infratentorial  lesions. 2. Mild cerebral volume loss/atrophy. MRI C-spine August 2019  1. Hyperintense T2 signal at the level C2 stable suggestive of  patient's diagnosis of multiple sclerosis/demyelinating white matter  disease. 2. Multilevel degenerative changes C2-C7 and at T1-T2, see above for  level level details. Suspected mild spinal canal stenosis C6-C7 with  possible abutment/impingement of exiting spinal nerve roots T1-T2  level, clinical correlation on physical exam with any level spinal  radiculopathy is recommended. Above for details.     MONICA Virus + from [facility]     Formal cognitive evaluation 10/2019  Subtest  Raw Score /Severity    Immediate Memory  27  /  Mild deficit      Recent Memory  27  /  Mild deficit      Temporal Orientation   (Recent Memory)  27  /  Mild deficit      Temporal Orientation   (Remote Memory)  29  /  Within Functional Limits      Spatial Orientation  30  /  Within Functional Limits      Orientation to Environment  28  /  Within Functional Limits      Recall of General Information  28  /  Within Functional Limits      Problem Solving & Abstract Reasoning  28  /  Within Functional Limits      Organization  30  /  Within Functional Limits      Auditory Processing   & Retention  30  /  Within Functional Limits     a1c - 5.8    t4 1.35  tsh 1.010    Assessment:     RRMS - stable without DMT   EDSS 1 -- optic neuritis     Memory difficulties --MMSE appears unchanged over the course of the past few years   Formal cognitive evaluation also demonstrated similar changes in 2019 as well as 2020 without worsening   Still doing exercises at home without improvement    Leg burning and tingling possibly related to LS radiculopathy     Plan:      Will continue to hold DMT    Formal cognitive evaluation will be repeated to the daughter noting increasing decline  However given the stability of FCE and MMSE's I will refer for neuropsych testing to determine how much anxiety could be contributing to her cognitive issues    Given her family history however I am considering doing a PET imaging    Call with issues    RTO 8-12 weeks     GER Novoa - CNS  9:48 AM  7/9/2021

## 2021-09-07 ENCOUNTER — OFFICE VISIT (OUTPATIENT)
Dept: NEUROLOGY | Age: 66
End: 2021-09-07
Payer: MEDICARE

## 2021-09-07 VITALS
BODY MASS INDEX: 21.34 KG/M2 | WEIGHT: 125 LBS | HEART RATE: 74 BPM | DIASTOLIC BLOOD PRESSURE: 82 MMHG | TEMPERATURE: 97.1 F | RESPIRATION RATE: 20 BRPM | SYSTOLIC BLOOD PRESSURE: 136 MMHG | HEIGHT: 64 IN | OXYGEN SATURATION: 94 %

## 2021-09-07 DIAGNOSIS — R41.841 COGNITIVE COMMUNICATION DEFICIT: ICD-10-CM

## 2021-09-07 DIAGNOSIS — G35 MULTIPLE SCLEROSIS (HCC): Primary | ICD-10-CM

## 2021-09-07 PROCEDURE — 99214 OFFICE O/P EST MOD 30 MIN: CPT | Performed by: CLINICAL NURSE SPECIALIST

## 2021-09-07 RX ORDER — DONEPEZIL HYDROCHLORIDE 10 MG/1
TABLET, FILM COATED ORAL
COMMUNITY
Start: 2021-06-08 | End: 2021-09-07

## 2021-09-07 RX ORDER — MONTELUKAST SODIUM 10 MG/1
TABLET ORAL
COMMUNITY
Start: 2021-08-12

## 2021-09-07 RX ORDER — RIVASTIGMINE 4.6 MG/24H
1 PATCH, EXTENDED RELEASE TRANSDERMAL DAILY
Qty: 30 PATCH | Refills: 2 | Status: SHIPPED | OUTPATIENT
Start: 2021-09-07

## 2021-09-07 NOTE — PROGRESS NOTES
Oly White is a 74 y.o. right handed female     Diagnosed with RRMS many years ago   Tried on numerous DMTs    Previously on Avonex, Copaxone, Novantrone, Tysabri and recently tried Plegridy   Previously failed Avonex and Rebif   Was tried on Novantrone for 1 month   Started on Tysabri prior to it being removed by the FDA and then started on Copaxone    Felt as if she was [place] a heart attack\" with each Copaxone injection    Restarted Tysabri and then was enrolled in [facility]   She was MONICA Virus positive and for years we discussed alternative DMTs    She was hesitant to switch  Followed with Dr Kenn Bradford in 2017 and switched to [address]   She was unable to tolerate and this medication was discontinued Jan 2018   He recommended she hold DMTs and I agreed   Now off DMT since Jan 2018      MRIs repeated in 2017 and 2019    Stable    she and her daughter have been noticing short term memory problems    Formal cog eval demonstrated only mild disease and therapy not recommended    Exercises at home with no change    We repeated the formal cognitive evaluation in 2020 but also showing only mild disease in immediate and recent memory   Family hx of Alzheimer's (dad) which has concerned her and her daughter   Refer to neuropsych testing-results have yet to be reported    No new medications reported  No new medical complaints  No chest pain or palpitations  No SOB  No vertigo, lightheadedness or loss of consciousness  No incontinence of bowels or bladder  No itching or bruising appreciated    ROS otherwise negative     Prior to Visit Medications    Medication Sig Taking?  Authorizing Provider   montelukast (SINGULAIR) 10 MG tablet TAKE 1 TABLET BY MOUTH AT BEDTIME Yes Historical Provider, MD   rivastigmine (EXELON) 4.6 MG/24HR Place 1 patch onto the skin daily Yes GER Arnold - CNS   DULoxetine (CYMBALTA) 60 MG extended release capsule  Yes Historical Provider, MD   traZODone (DESYREL) 100 MG tablet Take 50 mg by mouth nightly Takes 1/2 tablet nightly  Yes Historical Provider, MD   levothyroxine (SYNTHROID) 75 MCG tablet Take 75 mcg by mouth daily Yes Historical Provider, MD   PROAIR  (90 BASE) MCG/ACT inhaler Inhale 1 puff into the lungs as needed  Yes Historical Provider, MD   Cholecalciferol (VITAMIN D3) 1000 UNITS CAPS Take 1 capsule by mouth daily. Last dose 9/23/2014 Yes Historical Provider, MD   Cyanocobalamin (VITAMIN B-12) 2500 MCG SUBL Place 1 tablet under the tongue daily. Last dose 9/24/2014 Yes Historical Provider, MD   MULTIPLE VITAMIN PO Take 1 tablet by mouth daily. Last dose 9/22/2014 Yes Historical Provider, MD   pravastatin (PRAVACHOL) 20 MG tablet Take 20 mg by mouth daily. Yes Historical Provider, MD   LORazepam (ATIVAN) 0.5 MG tablet Take 0.5 mg by mouth daily as needed. Instructed to take morning of surgery with a sip of water if needs Yes Historical Provider, MD   sertraline (ZOLOFT) 100 MG tablet Take 50 mg by mouth nightly. To help sleep Yes Historical Provider, MD   Beclomethasone Dipropionate (QVAR IN) Inhale 2 puffs into the lungs as needed. Instructed to use am of surgery if needs and bring in Yes Historical Provider, MD   Levalbuterol HCl (XOPENEX IN) Inhale 2 puffs into the lungs as needed. Instructed to use am of surgery  If needs and bring in Yes Historical Provider, MD   Fish Oil OIL Take  by mouth 2 times daily.  1300 units daily 2x/day  Last dose 9/22/2014  Patient not taking: Reported on 9/7/2021  Historical Provider, MD     Allergies as of 09/07/2021 - Fully Reviewed 09/07/2021   Allergen Reaction Noted    Modafinil Itching, Swelling, and Anxiety 09/23/2014    Sulfa antibiotics Hives and Rash 03/07/2013       Objective:     Vitals:    09/07/21 1000   BP: 136/82   Pulse: 74   Resp: 20   Temp: 97.1 °F (36.2 °C)   SpO2: 94%      Head: Normocephalic, without obvious abnormality, atraumatic  Neck: limited ROM  Extremities: no edema -- no cyanosis or clubbing   Pulses: spinal  radiculopathy is recommended. Above for details. MONICA Virus + from [facility]     Formal cognitive evaluation 10/2019  Subtest  Raw Score /Severity    Immediate Memory  27  /  Mild deficit      Recent Memory  27  /  Mild deficit      Temporal Orientation   (Recent Memory)  27  /  Mild deficit      Temporal Orientation   (Remote Memory)  29  /  Within Functional Limits      Spatial Orientation  30  /  Within Functional Limits      Orientation to Environment  28  /  Within Functional Limits      Recall of General Information  28  /  Within Functional Limits      Problem Solving & Abstract Reasoning  28  /  Within Functional Limits      Organization  30  /  Within Functional Limits      Auditory Processing   & Retention  30  /  Within Functional Limits     a1c - 5.8    t4 1.35  tsh 1.010    Assessment:     RRMS - stable without DMT   EDSS 1 -- optic neuritis     Memory difficulties --MMSE appears unchanged over the course of the past few years   Formal cognitive evaluation also demonstrated similar changes in 2019 as well as 2020 without worsening   Still doing exercises at home without improvement   I am concerned with early Alzheimer's dementia    Leg burning and tingling possibly related to LS radiculopathy     Plan:      Will continue to hold DMT    Await neuropsych testing    Given her family history however I am considering doing a PET imaging   We did discuss this test may be skewed because of her MS history    Call with issues    RTO 4 week     GER Roberts - CNS  10:58 AM  9/7/2021

## 2022-03-07 ENCOUNTER — HOSPITAL ENCOUNTER (EMERGENCY)
Age: 67
Discharge: LEFT BEFORE DISCHARGE | End: 2022-03-07
Payer: MEDICARE

## 2022-03-07 VITALS
SYSTOLIC BLOOD PRESSURE: 108 MMHG | BODY MASS INDEX: 21.8 KG/M2 | HEART RATE: 70 BPM | OXYGEN SATURATION: 95 % | WEIGHT: 125 LBS | TEMPERATURE: 97.1 F | DIASTOLIC BLOOD PRESSURE: 82 MMHG

## 2022-03-07 LAB
ALBUMIN SERPL-MCNC: 5 G/DL (ref 3.5–5.2)
ALP BLD-CCNC: 61 U/L (ref 35–104)
ALT SERPL-CCNC: 36 U/L (ref 0–32)
ANION GAP SERPL CALCULATED.3IONS-SCNC: 10 MMOL/L (ref 7–16)
AST SERPL-CCNC: 67 U/L (ref 0–31)
BACTERIA: ABNORMAL /HPF
BASOPHILS ABSOLUTE: 0.06 E9/L (ref 0–0.2)
BASOPHILS RELATIVE PERCENT: 1.3 % (ref 0–2)
BILIRUB SERPL-MCNC: 0.6 MG/DL (ref 0–1.2)
BILIRUBIN URINE: NEGATIVE
BLOOD, URINE: ABNORMAL
BUN BLDV-MCNC: 10 MG/DL (ref 6–23)
CALCIUM SERPL-MCNC: 9.2 MG/DL (ref 8.6–10.2)
CHLORIDE BLD-SCNC: 91 MMOL/L (ref 98–107)
CLARITY: CLEAR
CO2: 28 MMOL/L (ref 22–29)
COLOR: YELLOW
CREAT SERPL-MCNC: 1 MG/DL (ref 0.5–1)
EOSINOPHILS ABSOLUTE: 0.04 E9/L (ref 0.05–0.5)
EOSINOPHILS RELATIVE PERCENT: 0.8 % (ref 0–6)
EPITHELIAL CELLS, UA: ABNORMAL /HPF
GFR AFRICAN AMERICAN: >60
GFR NON-AFRICAN AMERICAN: 55 ML/MIN/1.73
GLUCOSE BLD-MCNC: 124 MG/DL (ref 74–99)
GLUCOSE URINE: NEGATIVE MG/DL
HCT VFR BLD CALC: 39.4 % (ref 34–48)
HEMOGLOBIN: 13.6 G/DL (ref 11.5–15.5)
IMMATURE GRANULOCYTES #: 0.01 E9/L
IMMATURE GRANULOCYTES %: 0.2 % (ref 0–5)
KETONES, URINE: NEGATIVE MG/DL
LEUKOCYTE ESTERASE, URINE: NEGATIVE
LYMPHOCYTES ABSOLUTE: 1.22 E9/L (ref 1.5–4)
LYMPHOCYTES RELATIVE PERCENT: 25.9 % (ref 20–42)
MCH RBC QN AUTO: 33.7 PG (ref 26–35)
MCHC RBC AUTO-ENTMCNC: 34.5 % (ref 32–34.5)
MCV RBC AUTO: 97.5 FL (ref 80–99.9)
MONOCYTES ABSOLUTE: 0.39 E9/L (ref 0.1–0.95)
MONOCYTES RELATIVE PERCENT: 8.3 % (ref 2–12)
NEUTROPHILS ABSOLUTE: 2.99 E9/L (ref 1.8–7.3)
NEUTROPHILS RELATIVE PERCENT: 63.5 % (ref 43–80)
NITRITE, URINE: NEGATIVE
PDW BLD-RTO: 12.9 FL (ref 11.5–15)
PH UA: 6.5 (ref 5–9)
PLATELET # BLD: 240 E9/L (ref 130–450)
PMV BLD AUTO: 9 FL (ref 7–12)
POTASSIUM REFLEX MAGNESIUM: 3.7 MMOL/L (ref 3.5–5)
PROTEIN UA: NEGATIVE MG/DL
RBC # BLD: 4.04 E12/L (ref 3.5–5.5)
RBC UA: ABNORMAL /HPF (ref 0–2)
SODIUM BLD-SCNC: 129 MMOL/L (ref 132–146)
SPECIFIC GRAVITY UA: <=1.005 (ref 1–1.03)
TOTAL PROTEIN: 7.4 G/DL (ref 6.4–8.3)
UROBILINOGEN, URINE: 0.2 E.U./DL
WBC # BLD: 4.7 E9/L (ref 4.5–11.5)
WBC UA: ABNORMAL /HPF (ref 0–5)

## 2022-03-07 PROCEDURE — 80053 COMPREHEN METABOLIC PANEL: CPT

## 2022-03-07 PROCEDURE — 36415 COLL VENOUS BLD VENIPUNCTURE: CPT

## 2022-03-07 PROCEDURE — 85025 COMPLETE CBC W/AUTO DIFF WBC: CPT

## 2022-03-07 PROCEDURE — 81001 URINALYSIS AUTO W/SCOPE: CPT

## 2022-03-07 PROCEDURE — 4500000002 HC ER NO CHARGE

## 2022-03-08 ENCOUNTER — APPOINTMENT (OUTPATIENT)
Dept: CT IMAGING | Age: 67
End: 2022-03-08
Payer: MEDICARE

## 2022-03-08 ENCOUNTER — APPOINTMENT (OUTPATIENT)
Dept: GENERAL RADIOLOGY | Age: 67
End: 2022-03-08
Payer: MEDICARE

## 2022-03-08 ENCOUNTER — TELEPHONE (OUTPATIENT)
Dept: NEUROLOGY | Age: 67
End: 2022-03-08

## 2022-03-08 ENCOUNTER — HOSPITAL ENCOUNTER (EMERGENCY)
Age: 67
Discharge: HOME | End: 2022-03-08
Attending: EMERGENCY MEDICINE
Payer: MEDICARE

## 2022-03-08 VITALS
BODY MASS INDEX: 20.91 KG/M2 | OXYGEN SATURATION: 97 % | WEIGHT: 118 LBS | RESPIRATION RATE: 16 BRPM | HEIGHT: 63 IN | HEART RATE: 81 BPM | DIASTOLIC BLOOD PRESSURE: 72 MMHG | TEMPERATURE: 98.4 F | SYSTOLIC BLOOD PRESSURE: 114 MMHG

## 2022-03-08 DIAGNOSIS — G35 MS (MULTIPLE SCLEROSIS) (HCC): Primary | ICD-10-CM

## 2022-03-08 DIAGNOSIS — R41.0 INTERMITTENT CONFUSION: Primary | ICD-10-CM

## 2022-03-08 LAB
ACETAMINOPHEN LEVEL: <5 MCG/ML (ref 10–30)
ALBUMIN SERPL-MCNC: 4.5 G/DL (ref 3.5–5.2)
ALP BLD-CCNC: 57 U/L (ref 35–104)
ALT SERPL-CCNC: 36 U/L (ref 0–32)
AMMONIA: 14.3 UMOL/L (ref 11–51)
AMPHETAMINE SCREEN, URINE: NOT DETECTED
ANION GAP SERPL CALCULATED.3IONS-SCNC: 8 MMOL/L (ref 7–16)
AST SERPL-CCNC: 57 U/L (ref 0–31)
BARBITURATE SCREEN URINE: NOT DETECTED
BASOPHILS ABSOLUTE: 0.06 E9/L (ref 0–0.2)
BASOPHILS RELATIVE PERCENT: 1.1 % (ref 0–2)
BENZODIAZEPINE SCREEN, URINE: NOT DETECTED
BILIRUB SERPL-MCNC: 0.3 MG/DL (ref 0–1.2)
BILIRUBIN URINE: NEGATIVE
BLOOD, URINE: NEGATIVE
BUN BLDV-MCNC: 12 MG/DL (ref 6–23)
CALCIUM SERPL-MCNC: 8.9 MG/DL (ref 8.6–10.2)
CANNABINOID SCREEN URINE: NOT DETECTED
CHLORIDE BLD-SCNC: 96 MMOL/L (ref 98–107)
CHP ED QC CHECK: NORMAL
CLARITY: CLEAR
CO2: 27 MMOL/L (ref 22–29)
COCAINE METABOLITE SCREEN URINE: NOT DETECTED
COLOR: YELLOW
CREAT SERPL-MCNC: 1 MG/DL (ref 0.5–1)
EOSINOPHILS ABSOLUTE: 0.04 E9/L (ref 0.05–0.5)
EOSINOPHILS RELATIVE PERCENT: 0.7 % (ref 0–6)
ETHANOL: <10 MG/DL (ref 0–0.08)
FENTANYL SCREEN, URINE: NOT DETECTED
GFR AFRICAN AMERICAN: >60
GFR NON-AFRICAN AMERICAN: 55 ML/MIN/1.73
GLUCOSE BLD-MCNC: 129 MG/DL (ref 74–99)
GLUCOSE BLD-MCNC: 140 MG/DL
GLUCOSE URINE: NEGATIVE MG/DL
HCT VFR BLD CALC: 37.9 % (ref 34–48)
HEMOGLOBIN: 13.1 G/DL (ref 11.5–15.5)
IMMATURE GRANULOCYTES #: 0.01 E9/L
IMMATURE GRANULOCYTES %: 0.2 % (ref 0–5)
KETONES, URINE: NEGATIVE MG/DL
LACTIC ACID: 1.1 MMOL/L (ref 0.5–2.2)
LEUKOCYTE ESTERASE, URINE: NEGATIVE
LIPASE: 75 U/L (ref 13–60)
LYMPHOCYTES ABSOLUTE: 1.17 E9/L (ref 1.5–4)
LYMPHOCYTES RELATIVE PERCENT: 21.9 % (ref 20–42)
Lab: NORMAL
MCH RBC QN AUTO: 34.4 PG (ref 26–35)
MCHC RBC AUTO-ENTMCNC: 34.6 % (ref 32–34.5)
MCV RBC AUTO: 99.5 FL (ref 80–99.9)
METER GLUCOSE: 140 MG/DL (ref 74–99)
METHADONE SCREEN, URINE: NOT DETECTED
MONOCYTES ABSOLUTE: 0.39 E9/L (ref 0.1–0.95)
MONOCYTES RELATIVE PERCENT: 7.3 % (ref 2–12)
NEUTROPHILS ABSOLUTE: 3.67 E9/L (ref 1.8–7.3)
NEUTROPHILS RELATIVE PERCENT: 68.8 % (ref 43–80)
NITRITE, URINE: NEGATIVE
OPIATE SCREEN URINE: NOT DETECTED
OXYCODONE URINE: NOT DETECTED
PDW BLD-RTO: 13.2 FL (ref 11.5–15)
PH UA: 6.5 (ref 5–9)
PHENCYCLIDINE SCREEN URINE: NOT DETECTED
PLATELET # BLD: 237 E9/L (ref 130–450)
PMV BLD AUTO: 9.2 FL (ref 7–12)
POTASSIUM REFLEX MAGNESIUM: 3.6 MMOL/L (ref 3.5–5)
PROTEIN UA: NEGATIVE MG/DL
RBC # BLD: 3.81 E12/L (ref 3.5–5.5)
SALICYLATE, SERUM: <0.3 MG/DL (ref 0–30)
SODIUM BLD-SCNC: 131 MMOL/L (ref 132–146)
SPECIFIC GRAVITY UA: 1.01 (ref 1–1.03)
TOTAL PROTEIN: 6.5 G/DL (ref 6.4–8.3)
TRICYCLIC ANTIDEPRESSANTS SCREEN SERUM: NEGATIVE NG/ML
TROPONIN, HIGH SENSITIVITY: 11 NG/L (ref 0–9)
TROPONIN, HIGH SENSITIVITY: 13 NG/L (ref 0–9)
UROBILINOGEN, URINE: 0.2 E.U./DL
WBC # BLD: 5.3 E9/L (ref 4.5–11.5)

## 2022-03-08 PROCEDURE — 70450 CT HEAD/BRAIN W/O DYE: CPT

## 2022-03-08 PROCEDURE — 36415 COLL VENOUS BLD VENIPUNCTURE: CPT

## 2022-03-08 PROCEDURE — 80179 DRUG ASSAY SALICYLATE: CPT

## 2022-03-08 PROCEDURE — 81003 URINALYSIS AUTO W/O SCOPE: CPT

## 2022-03-08 PROCEDURE — 2580000003 HC RX 258: Performed by: EMERGENCY MEDICINE

## 2022-03-08 PROCEDURE — 80307 DRUG TEST PRSMV CHEM ANLYZR: CPT

## 2022-03-08 PROCEDURE — 85025 COMPLETE CBC W/AUTO DIFF WBC: CPT

## 2022-03-08 PROCEDURE — 84484 ASSAY OF TROPONIN QUANT: CPT

## 2022-03-08 PROCEDURE — 82077 ASSAY SPEC XCP UR&BREATH IA: CPT

## 2022-03-08 PROCEDURE — 80053 COMPREHEN METABOLIC PANEL: CPT

## 2022-03-08 PROCEDURE — 93005 ELECTROCARDIOGRAM TRACING: CPT | Performed by: PHYSICIAN ASSISTANT

## 2022-03-08 PROCEDURE — 83690 ASSAY OF LIPASE: CPT

## 2022-03-08 PROCEDURE — 71046 X-RAY EXAM CHEST 2 VIEWS: CPT

## 2022-03-08 PROCEDURE — 99284 EMERGENCY DEPT VISIT MOD MDM: CPT

## 2022-03-08 PROCEDURE — 82962 GLUCOSE BLOOD TEST: CPT

## 2022-03-08 PROCEDURE — 80143 DRUG ASSAY ACETAMINOPHEN: CPT

## 2022-03-08 PROCEDURE — 96360 HYDRATION IV INFUSION INIT: CPT

## 2022-03-08 PROCEDURE — 82140 ASSAY OF AMMONIA: CPT

## 2022-03-08 PROCEDURE — 83605 ASSAY OF LACTIC ACID: CPT

## 2022-03-08 RX ORDER — 0.9 % SODIUM CHLORIDE 0.9 %
1000 INTRAVENOUS SOLUTION INTRAVENOUS ONCE
Status: COMPLETED | OUTPATIENT
Start: 2022-03-08 | End: 2022-03-08

## 2022-03-08 RX ORDER — SODIUM CHLORIDE 0.9 % (FLUSH) 0.9 %
SYRINGE (ML) INJECTION
Status: DISCONTINUED
Start: 2022-03-08 | End: 2022-03-08 | Stop reason: HOSPADM

## 2022-03-08 RX ADMIN — SODIUM CHLORIDE 1000 ML: 9 INJECTION, SOLUTION INTRAVENOUS at 19:26

## 2022-03-08 NOTE — ED PROVIDER NOTES
ATTENDING PROVIDER ATTESTATION:     Ameena Dale presented to the emergency department for evaluation of Altered Mental Status (cognitive issues, weight loss, weakness, confusion, hx MS, daughter states progressively worsening over this last week) and Gait Problem   and was initially evaluated by the Medical Resident. See Original ED Note for H&P and ED course above. I have reviewed and discussed the case, including pertinent history (medical, surgical, family and social) and exam findings with the Medical Resident assigned to Ameena Dale. I have personally performed and/or participated in the history, exam, medical decision making, and procedures and agree with all pertinent clinical information. I, Dr. Leslie Padron MD, am the primary provider of this record. I have reviewed my findings and recommendations with the assigned Medical Resident, Ameena Dale and members of family present at the time of disposition. My findings/plan: The encounter diagnosis was Intermittent confusion. Discharge Medication List as of 3/8/2022  8:37 PM        Leslie Padron MD      [facility]  Department of Emergency Medicine     Written by: Leslie Padron MD  Patient Name: Ameena Dale  Attending Provider: No att. providers found  Admit Date: 3/8/2022  2:40 PM  MRN: [id number]                   : 1955        Chief Complaint   Patient presents with    Altered Mental Status     cognitive issues, weight loss, weakness, confusion, hx MS, daughter states progressively worsening over this last week    Gait Problem    - Chief complaint    Ms. Maximo Magana is a 73 yo female with a PMHx of MS who presents to the ED due to altered mental status and left leg issues. Patient's daughter was in the room and provides some history alongside with patient.   Patient has no complaints currently but her daughter states over the past week she has been increasingly confused and forgetful. She notes that she picks up her grandson every day and forgot to pick him up 2 days in a row. She states that she has been going to physical therapy as well and on multiple occasions her left leg is giving out on her. She denies any falls or trauma. She denies any loss of conscious. She has no pain in the left leg. Denies any diplopia, vision changes or focal numbness or weakness. She has no focal neurologic deficits currently. She is alert and oriented x3. There is no noticeable nuchal rigidity or meningeal signs. She is no ataxia with finger-nose or heel-to-shin. Symptoms seem to be intermittent in nature. Denies any aggravating or relieving factors. She denies any recent fevers, chills, nausea, vomiting, chest pain, shortness of breath, abdominal pain, bowel or urinary changes, headaches or lower extremity edema. Review of Systems   Constitutional: Negative for chills, fatigue and fever. HENT: Negative for congestion, sinus pressure and sinus pain. Eyes: Negative for pain and redness. Respiratory: Negative for cough, chest tightness and shortness of breath. Cardiovascular: Negative for chest pain, palpitations and leg swelling. Gastrointestinal: Negative for abdominal pain, constipation, diarrhea, nausea and vomiting. Genitourinary: Negative for dysuria, flank pain, frequency, hematuria and urgency. Musculoskeletal: Positive for gait problem (Left leg gives out randomly). Negative for arthralgias, back pain, joint swelling and myalgias. Skin: Negative for rash. Neurological: Negative for dizziness, seizures, syncope, speech difficulty, weakness, light-headedness, numbness and headaches. Psychiatric/Behavioral: Positive for confusion (Over past week). Physical Exam  Constitutional:       General: She is not in acute distress. Appearance: Normal appearance. She is well-developed and normal weight.  She is not ill-appearing. HENT:      Head: Normocephalic and atraumatic. Right Ear: External ear normal.      Left Ear: External ear normal.      Mouth/Throat:      Mouth: Mucous membranes are moist.      Pharynx: Oropharynx is clear. Eyes:      Extraocular Movements: Extraocular movements intact. Conjunctiva/sclera: Conjunctivae normal.   Cardiovascular:      Rate and Rhythm: Normal rate and regular rhythm. Pulses: Normal pulses. Heart sounds: Normal heart sounds. Pulmonary:      Effort: Pulmonary effort is normal. No respiratory distress. Breath sounds: Normal breath sounds. No wheezing. Abdominal:      General: Abdomen is flat. Bowel sounds are normal. There is no distension. Palpations: Abdomen is soft. Tenderness: There is no abdominal tenderness. There is no guarding. Musculoskeletal:         General: Normal range of motion. Cervical back: Normal range of motion and neck supple. Comments: LLE-no swelling or tenderness, DP and PT pulses intact, no wounds, normal range of motion, soft and easily compressible   Skin:     General: Skin is warm and dry. Neurological:      General: No focal deficit present. Mental Status: She is alert and oriented to person, place, and time. Mental status is at baseline. Cranial Nerves: No cranial nerve deficit. Sensory: No sensory deficit. Motor: No weakness. Psychiatric:         Mood and Affect: Mood normal.         Behavior: Behavior normal.          Procedures       MDM  Number of Diagnoses or Management Options  Altered mental status, unspecified altered mental status type  Diagnosis management comments: This is a 75 yo female with a PMHx of MS who presents to the ED due to altered mental status and left leg issues. Patient's EKG revealed normal sinus rhythm with a rate of 64 without any acute interval or ST segment changes. Her CBC was markedly benign within normal limits.   CMP revealed mild hyponatremia sodium 131 and slight elevation in her transaminases with ALT of 36, AST of 57. Patient was given 1 L normal saline bolus. Initial troponin was 13 with a repeat of 11 which was downtrending and a delta of 2 and thus negative. Point-of-care glucose was 140. Urine drug screen was negative and serum drug screen was also negative. Urinalysis did not reveal any urinary tract infection. Lactic acid lipase and ammonia were all normal.  Chest x-ray revealed no acute process. CT head revealed no acute intracranial abnormality with cortical atrophy and periventricular leukomalacia. Patient was ambulated without difficulty around the emergency department. She has clear speech, no focal deficits, and a NIH of 0. Given constellation of negative findings patient will be discharged home at this time and told to follow with her primary care provider and neurologist for further work-up of her ongoing symptoms. She has been told to return to the emergency department if her symptoms return, worsen or change in any time. Amount and/or Complexity of Data Reviewed  Decide to obtain previous medical records or to obtain history from someone other than the patient: yes           ED Course as of 03/09/22 2324   Tue Mar 08, 2022   1453 EKG: This EKG is signed and interpreted by me. Rate: 64  Rhythm: Sinus  Interpretation: Normal sinus rhythm, normal OK interval, low voltage QRS, normal QT interval, nonspecific T wave flattening  Comparison: no previous EKG available     [JA]   4266 Patient is a 73-year-old female with longstanding history of MS presenting after her left leg was giving out. She states that she is feeling okay at this time. She denies any falls or trauma. No leg pain. No diplopia, vision changes, or focal numbness or weakness. She has had no fevers, chest pain, abdominal pain, nausea, vomiting, or diarrhea. Patient is awake and alert on my examination. She has no focal neurologic deficits.   She has no nuchal rigidity or meningeal signs. She has no ataxia with finger-to-nose or heel-to-shin. Heart lung sounds are normal.  Abdomen is benign without focal tenderness. Work-up is pending [JA]   2019 Patient was ambulated around the emergency department without difficulty. [JS]      ED Course User Index  [JA] Cassie Murguia MD  [JS] Pardeep Roque, DO       --------------------------------------------- PAST HISTORY ---------------------------------------------  Past Medical History:  has a past medical history of Anxiety, Asthma, Colon polyps, GERD (gastroesophageal reflux disease), Hyperlipidemia, Multiple sclerosis ([address] MVP (mitral valve prolapse), and Thyroid disease. Past Surgical History:  has a past surgical history that includes Carpal tunnel release (Left, 2012) and Thyroidectomy (1990's). Social History:  reports that she quit smoking about 24 years ago. She has never used smokeless tobacco. She reports current alcohol use. She reports current drug use. Drug: Marijuana Viri Jeanette). Family History: family history includes Alzheimer's Disease in her father; Asthma in her brother, sister, and sister; Cancer in her maternal grandmother and paternal grandfather; Celiac Disease in her sister; Diabetes in her sister; Heart Disease in her father. The patients home medications have been reviewed. Allergies: Modafinil and Sulfa antibiotics    -------------------------------------------------- RESULTS -------------------------------------------------  EKG: This EKG is signed and interpreted by me.     Rate: 64  Rhythm: Sinus  Interpretation: no acute changes  Comparison: no previous EKG available    Labs:  Results for orders placed or performed during the hospital encounter of 03/08/22   CBC with Auto Differential   Result Value Ref Range    WBC 5.3 4.5 - 11.5 E9/L    RBC 3.81 3.50 - 5.50 E12/L    Hemoglobin 13.1 11.5 - 15.5 g/dL    Hematocrit 37.9 34.0 - 48.0 %    MCV 99.5 80.0 - 99.9 fL    MCH 34.4 26.0 - 35.0 pg    MCHC 34.6 (H) 32.0 - 34.5 %    RDW 13.2 11.5 - 15.0 fL    Platelets 251 944 - 077 E9/L    MPV 9.2 7.0 - 12.0 fL    Neutrophils % 68.8 43.0 - 80.0 %    Immature Granulocytes % 0.2 0.0 - 5.0 %    Lymphocytes % 21.9 20.0 - 42.0 %    Monocytes % 7.3 2.0 - 12.0 %    Eosinophils % 0.7 0.0 - 6.0 %    Basophils % 1.1 0.0 - 2.0 %    Neutrophils Absolute 3.67 1.80 - 7.30 E9/L    Immature Granulocytes # 0.01 E9/L    Lymphocytes Absolute 1.17 (L) 1.50 - 4.00 E9/L    Monocytes Absolute 0.39 0.10 - 0.95 E9/L    Eosinophils Absolute 0.04 (L) 0.05 - 0.50 E9/L    Basophils Absolute 0.06 0.00 - 0.20 E9/L   Comprehensive Metabolic Panel w/ Reflex to MG   Result Value Ref Range    Sodium 131 (L) 132 - 146 mmol/L    Potassium reflex Magnesium 3.6 3.5 - 5.0 mmol/L    Chloride 96 (L) 98 - 107 mmol/L    CO2 27 22 - 29 mmol/L    Anion Gap 8 7 - 16 mmol/L    Glucose 129 (H) 74 - 99 mg/dL    BUN 12 6 - 23 mg/dL    CREATININE 1.0 0.5 - 1.0 mg/dL    GFR Non-African American 55 >=60 mL/min/1.73    GFR African American >60     Calcium 8.9 8.6 - 10.2 mg/dL    Total Protein 6.5 6.4 - 8.3 g/dL    Albumin 4.5 3.5 - 5.2 g/dL    Total Bilirubin 0.3 0.0 - 1.2 mg/dL    Alkaline Phosphatase 57 35 - 104 U/L    ALT 36 (H) 0 - 32 U/L    AST 57 (H) 0 - 31 U/L   Troponin   Result Value Ref Range    Troponin, High Sensitivity 13 (H) 0 - 9 ng/L   URINE DRUG SCREEN   Result Value Ref Range    Amphetamine Screen, Urine NOT DETECTED Negative <1000 ng/mL    Barbiturate Screen, Ur NOT DETECTED Negative < 200 ng/mL    Benzodiazepine Screen, Urine NOT DETECTED Negative < 200 ng/mL    Cannabinoid Scrn, Ur NOT DETECTED Negative < 50ng/mL    Cocaine Metabolite Screen, Urine NOT DETECTED Negative < 300 ng/mL    Opiate Scrn, Ur NOT DETECTED Negative < 300ng/mL    PCP Screen, Urine NOT DETECTED Negative < 25 ng/mL    Methadone Screen, Urine NOT DETECTED Negative <300 ng/mL    Oxycodone Urine NOT DETECTED Negative <100 ng/mL    FENTANYL SCREEN, URINE NOT DETECTED Negative <1 ng/mL    Drug Screen Comment: see below    Serum Drug Screen   Result Value Ref Range    Ethanol Lvl <10 mg/dL    Acetaminophen Level <5.0 (L) 10.0 - 67.7 mcg/mL    Salicylate, Serum <2.4 0.0 - 30.0 mg/dL    TCA Scrn NEGATIVE Cutoff:300 ng/mL   Urinalysis   Result Value Ref Range    Color, UA Yellow Straw/Yellow    Clarity, UA Clear Clear    Glucose, Ur Negative Negative mg/dL    Bilirubin Urine Negative Negative    Ketones, Urine Negative Negative mg/dL    Specific Gravity, UA 1.015 1.005 - 1.030    Blood, Urine Negative Negative    pH, UA 6.5 5.0 - 9.0    Protein, UA Negative Negative mg/dL    Urobilinogen, Urine 0.2 <2.0 E.U./dL    Nitrite, Urine Negative Negative    Leukocyte Esterase, Urine Negative Negative   Lactic Acid   Result Value Ref Range    Lactic Acid 1.1 0.5 - 2.2 mmol/L   Lipase   Result Value Ref Range    Lipase 75 (H) 13 - 60 U/L   Ammonia   Result Value Ref Range    Ammonia 14.3 11.0 - 51.0 umol/L   Troponin   Result Value Ref Range    Troponin, High Sensitivity 11 (H) 0 - 9 ng/L   POCT Glucose   Result Value Ref Range    Glucose 140 mg/dL    QC OK? OK    POCT Glucose   Result Value Ref Range    Meter Glucose 140 (H) 74 - 99 mg/dL   EKG 12 Lead   Result Value Ref Range    Ventricular Rate 64 BPM    Atrial Rate 64 BPM    P-R Interval 188 ms    QRS Duration 82 ms    Q-T Interval 388 ms    QTc Calculation (Bazett) 400 ms    P Axis 51 degrees    R Axis -26 degrees    T Axis 89 degrees       Radiology:  XR CHEST (2 VW)   Final Result   No acute process. CT HEAD WO CONTRAST   Final Result   No acute intracranial abnormality. Cortical atrophy and periventricular leukomalacia.             ------------------------- NURSING NOTES AND VITALS REVIEWED ---------------------------  Date / Time Roomed:  3/8/2022  2:40 PM  ED Bed Assignment:  13/13    The nursing notes within the ED encounter and vital signs as below have been reviewed.    /72   Pulse 81   Temp 98.4 °F (36.9 °C) (Temporal)   Resp 16   Ht 5' 3\" (1.6 m)   Wt 118 lb (53.5 kg)   SpO2 97%   BMI 20.90 kg/m²   Oxygen Saturation Interpretation: Normal      ------------------------------------------ PROGRESS NOTES ------------------------------------------  12:26 PM EST  I have spoken with the patient and daughter and discussed todays results, in addition to providing specific details for the plan of care and counseling regarding the diagnosis and prognosis. Their questions are answered at this time and they are agreeable with the plan. I discussed at length with them reasons for immediate return here for re evaluation. They will followup with their neurologist and primary care physician by calling their office tomorrow. --------------------------------- ADDITIONAL PROVIDER NOTES ---------------------------------  At this time the patient is without objective evidence of an acute process requiring hospitalization or inpatient management. They have remained hemodynamically stable throughout their entire ED visit and are stable for discharge with outpatient follow-up. The plan has been discussed in detail and they are aware of the specific conditions for emergent return, as well as the importance of follow-up. Discharge Medication List as of 3/8/2022  8:37 PM          Diagnosis:  1. Intermittent confusion        Disposition:  Patient's disposition: Discharge to home  Patient's condition is stable. Patient was seen and evaluated by myself and my attending No att. providers found. Assessment and Plan discussed with attending provider, please see attestation for final plan of care.      MD Elver Vargas,   Resident  03/09/22 [id number]       Juju Rizzo MD  03/09/22 [id number]

## 2022-03-08 NOTE — TELEPHONE ENCOUNTER
MA notified patient's daughter of Emiliana Morgancristy s response. MRI to be scheduled at [facility] in [address] [place] [address].  They will call daughter  Electronically signed by Godfrey Dance, MA on 3/8/22 at 10:17 AM EST

## 2022-03-08 NOTE — TELEPHONE ENCOUNTER
Patient's daughter called in to say that her mom was in the ER for 5 hrs yesterday per PCP request, and left before cat scan done. She is having speech issues and confusion. Please advise.   Electronically signed by Pranav Mariee MA on 3/8/22 at 8:40 AM EST

## 2022-03-08 NOTE — ED NOTES
Department of Emergency Medicine    FIRST PROVIDER TRIAGE NOTE             Independent MLP           3/8/22  1:39 PM EST    Date of Encounter: 3/8/22   MRN: [id number]    Vitals:    03/08/22 1338   BP: 108/68   Pulse: 84   Resp: 14   Temp: 98.4 °F (36.9 °C)   TempSrc: Temporal   SpO2: 97%   Weight: 118 lb (53.5 kg)   Height: 5' 3\" (1.6 m)      HPI: Fracnine Medina is a 74 y.o. female who presents to the ED for Altered Mental Status (cognitive issues, weight loss, weakness, confusion, hx MS, daughter states progressively worsening over this last week) and Gait Problem    ROS: Negative for cp, sob, abd pain, fever, vomiting, diarrhea or urinary complaints. Physical Exam:   Gen Appearance/Constitutional: alert     Initial Plan of Care: All treatment areas with department are currently occupied. Plan to order/Initiate the following while awaiting opening in ED: labs, EKG and imaging studies.     Initial Plan of Care: Initiate Treatment-Testing, Proceed toTreatment Area When Bed Available for ED Attending/MLP to Continue Care    Electronically signed by Akosua Shea PA-C   DD: 3/8/22       Akosua Shea PA-C  03/08/22 [id number]

## 2022-03-09 ASSESSMENT — ENCOUNTER SYMPTOMS
BACK PAIN: 0
EYE PAIN: 0
EYE REDNESS: 0
VOMITING: 0
SINUS PAIN: 0
ABDOMINAL PAIN: 0
SHORTNESS OF BREATH: 0
CHEST TIGHTNESS: 0
CONSTIPATION: 0
SINUS PRESSURE: 0
DIARRHEA: 0
NAUSEA: 0
COUGH: 0

## 2022-03-09 NOTE — ED NOTES
PT AMBULATED TO BR AT THIS TIME TO GIVE URINE SAMPLE.  GAIT STEADY      Shruthi Valentine RN  03/08/22 2007

## 2022-03-09 NOTE — ED NOTES
PT PREPARED FOR DC. PT VERBALIZED UNDERSTANDING OF DC INSTRUCTIONS. PT AMBULATORY AT TIME OF DC, NO SIGNS OF DISTRESS.  GAIT STEADY       Skylar Mcgee RN  03/08/22 [id number]

## 2022-03-10 LAB
EKG ATRIAL RATE: 64 BPM
EKG P AXIS: 51 DEGREES
EKG P-R INTERVAL: 188 MS
EKG Q-T INTERVAL: 388 MS
EKG QRS DURATION: 82 MS
EKG QTC CALCULATION (BAZETT): 400 MS
EKG R AXIS: -26 DEGREES
EKG T AXIS: 89 DEGREES
EKG VENTRICULAR RATE: 64 BPM

## 2022-03-10 PROCEDURE — 93010 ELECTROCARDIOGRAM REPORT: CPT | Performed by: INTERNAL MEDICINE

## 2022-03-11 DIAGNOSIS — G35 MS (MULTIPLE SCLEROSIS) (HCC): ICD-10-CM
